# Patient Record
Sex: FEMALE | Race: WHITE | Employment: OTHER
[De-identification: names, ages, dates, MRNs, and addresses within clinical notes are randomized per-mention and may not be internally consistent; named-entity substitution may affect disease eponyms.]

---

## 2018-03-09 ENCOUNTER — SURGERY (OUTPATIENT)
Age: 83
End: 2018-03-09

## 2018-03-09 ENCOUNTER — ANESTHESIA EVENT (OUTPATIENT)
Dept: GASTROENTEROLOGY | Facility: CLINIC | Age: 83
End: 2018-03-09
Payer: MEDICARE

## 2018-03-09 ENCOUNTER — HOSPITAL ENCOUNTER (OUTPATIENT)
Facility: CLINIC | Age: 83
Discharge: HOME | End: 2018-03-09
Attending: INTERNAL MEDICINE | Admitting: INTERNAL MEDICINE
Payer: MEDICARE

## 2018-03-09 ENCOUNTER — ANESTHESIA (OUTPATIENT)
Dept: GASTROENTEROLOGY | Facility: CLINIC | Age: 83
End: 2018-03-09
Payer: MEDICARE

## 2018-03-09 VITALS
SYSTOLIC BLOOD PRESSURE: 139 MMHG | WEIGHT: 185 LBS | OXYGEN SATURATION: 96 % | RESPIRATION RATE: 11 BRPM | DIASTOLIC BLOOD PRESSURE: 73 MMHG | BODY MASS INDEX: 30.82 KG/M2 | HEIGHT: 65 IN

## 2018-03-09 LAB — COLONOSCOPY: NORMAL

## 2018-03-09 PROCEDURE — 40000010 ZZH STATISTIC ANES STAT CODE-CRNA PER MINUTE: Performed by: INTERNAL MEDICINE

## 2018-03-09 PROCEDURE — 37000008 ZZH ANESTHESIA TECHNICAL FEE, 1ST 30 MIN: Performed by: INTERNAL MEDICINE

## 2018-03-09 PROCEDURE — 45378 DIAGNOSTIC COLONOSCOPY: CPT | Performed by: INTERNAL MEDICINE

## 2018-03-09 PROCEDURE — G0105 COLORECTAL SCRN; HI RISK IND: HCPCS | Performed by: INTERNAL MEDICINE

## 2018-03-09 PROCEDURE — 25000128 H RX IP 250 OP 636: Performed by: NURSE ANESTHETIST, CERTIFIED REGISTERED

## 2018-03-09 PROCEDURE — 25000125 ZZHC RX 250: Performed by: NURSE ANESTHETIST, CERTIFIED REGISTERED

## 2018-03-09 RX ORDER — ALBUTEROL SULFATE 90 UG/1
1-2 AEROSOL, METERED RESPIRATORY (INHALATION)
COMMUNITY
Start: 2017-11-17

## 2018-03-09 RX ORDER — LANOLIN ALCOHOL/MO/W.PET/CERES
400 CREAM (GRAM) TOPICAL
COMMUNITY

## 2018-03-09 RX ORDER — LIDOCAINE 40 MG/G
CREAM TOPICAL
Status: DISCONTINUED | OUTPATIENT
Start: 2018-03-09 | End: 2018-03-09 | Stop reason: HOSPADM

## 2018-03-09 RX ORDER — CALCIUM CARBONATE 500 MG/1
500 TABLET, CHEWABLE ORAL
COMMUNITY

## 2018-03-09 RX ORDER — DOXEPIN 6 MG/1
TABLET, FILM COATED ORAL
COMMUNITY
Start: 2018-03-06

## 2018-03-09 RX ORDER — PROPOFOL 10 MG/ML
INJECTION, EMULSION INTRAVENOUS CONTINUOUS PRN
Status: DISCONTINUED | OUTPATIENT
Start: 2018-03-09 | End: 2018-03-09

## 2018-03-09 RX ORDER — LIDOCAINE HYDROCHLORIDE 20 MG/ML
INJECTION, SOLUTION INFILTRATION; PERINEURAL PRN
Status: DISCONTINUED | OUTPATIENT
Start: 2018-03-09 | End: 2018-03-09

## 2018-03-09 RX ORDER — SODIUM CHLORIDE, SODIUM LACTATE, POTASSIUM CHLORIDE, CALCIUM CHLORIDE 600; 310; 30; 20 MG/100ML; MG/100ML; MG/100ML; MG/100ML
INJECTION, SOLUTION INTRAVENOUS CONTINUOUS PRN
Status: DISCONTINUED | OUTPATIENT
Start: 2018-03-09 | End: 2018-03-09

## 2018-03-09 RX ORDER — ONDANSETRON 2 MG/ML
4 INJECTION INTRAMUSCULAR; INTRAVENOUS
Status: DISCONTINUED | OUTPATIENT
Start: 2018-03-09 | End: 2018-03-09 | Stop reason: HOSPADM

## 2018-03-09 RX ORDER — PROPOFOL 10 MG/ML
INJECTION, EMULSION INTRAVENOUS PRN
Status: DISCONTINUED | OUTPATIENT
Start: 2018-03-09 | End: 2018-03-09

## 2018-03-09 RX ORDER — LEVOTHYROXINE SODIUM 50 UG/1
50 TABLET ORAL
COMMUNITY
Start: 2017-06-29

## 2018-03-09 RX ORDER — ACETAMINOPHEN 325 MG/1
325 TABLET ORAL
COMMUNITY

## 2018-03-09 RX ORDER — LISINOPRIL 10 MG/1
10 TABLET ORAL
COMMUNITY
Start: 2017-05-02

## 2018-03-09 RX ORDER — ONDANSETRON 2 MG/ML
INJECTION INTRAMUSCULAR; INTRAVENOUS PRN
Status: DISCONTINUED | OUTPATIENT
Start: 2018-03-09 | End: 2018-03-09

## 2018-03-09 RX ADMIN — SODIUM CHLORIDE, POTASSIUM CHLORIDE, SODIUM LACTATE AND CALCIUM CHLORIDE: 600; 310; 30; 20 INJECTION, SOLUTION INTRAVENOUS at 10:40

## 2018-03-09 RX ADMIN — PROPOFOL 125 MCG/KG/MIN: 10 INJECTION, EMULSION INTRAVENOUS at 10:40

## 2018-03-09 RX ADMIN — LIDOCAINE HYDROCHLORIDE 40 MG: 20 INJECTION, SOLUTION INFILTRATION; PERINEURAL at 10:40

## 2018-03-09 RX ADMIN — PROPOFOL 20 MG: 10 INJECTION, EMULSION INTRAVENOUS at 10:40

## 2018-03-09 RX ADMIN — ONDANSETRON 4 MG: 2 INJECTION INTRAMUSCULAR; INTRAVENOUS at 10:46

## 2018-03-09 ASSESSMENT — ENCOUNTER SYMPTOMS
DYSRHYTHMIAS: 1
SEIZURES: 0

## 2018-03-09 NOTE — ANESTHESIA PREPROCEDURE EVALUATION
Anesthesia Evaluation     . Pt has had prior anesthetic.     No history of anesthetic complications          ROS/MED HX    ENT/Pulmonary:     (+)sleep apnea, asthma doesn't use CPAP , . .    Neurologic: Comment: TBI     (-) seizures and CVA   Cardiovascular:     (+) Dyslipidemia, hypertension--CAD, --stent,. : . CHF . fainting (syncope). pacemaker :- Patient is dependent on pacemaker . dysrhythmias a-fib and 3rd Deg Heart Block, valvular problems/murmurs type: MR . Previous cardiac testing Echodate:2016results:Limited Echocardiogram performed   1. Normal left ventricular size, moderately increased wall thickness, low normal global systolic function, calculated EF of 54 %.   2. Posterior wall is abnormal.   3. Mildly enlarged left atrium.   4. Mild-moderate tricuspid regurgitationdate: results: date: results: date: results:          METS/Exercise Tolerance:     Hematologic:         Musculoskeletal:         GI/Hepatic:     (+) GERD Asymptomatic on medication,      (-) liver disease   Renal/Genitourinary:      (-) renal disease   Endo:     (+) thyroid problem hypothyroidism, .   (-) Type II DM   Psychiatric:         Infectious Disease:         Malignancy:         Other:                     Physical Exam  Normal systems: cardiovascular, pulmonary and dental    Airway   Mallampati: II  TM distance: >3 FB  Neck ROM: full    Dental     Cardiovascular       Pulmonary                     Anesthesia Plan      History & Physical Review  History and physical reviewed and following examination; no interval change.    ASA Status:  3 .    NPO Status:  > 8 hours    Plan for MAC Reason for MAC:  Deep or markedly invasive procedure (G8)  PONV prophylaxis:  Ondansetron (or other 5HT-3)       Postoperative Care      Consents  Anesthetic plan, risks, benefits and alternatives discussed with:  Patient and Spouse..        Procedure: Procedure(s):  COLONOSCOPY  Preop diagnosis: DUE TO AGE     Allergies   Allergen Reactions     Clavulanic  "Acid      Codeine Hives     Contrast Dye Hives     Appears to have tolerated with Benadryl IV   This occurred in 1984 after a renal artery angiogram.  Katelynn Evans RN ....................  8/20/2015   9:55 AM     Cortisone      Levofloxacin Diarrhea     Methylprednisolone Unknown     Patient unsure if reaction, developed 3 individual hives.  but has tolerated prednisone in past     Morphine      Oxycodone Hives and GI Disturbance     Pradaxa      Rosuvastatin Other (See Comments)     myalgia     Sulfa Drugs      Adhesive Tape Rash     Prednisone Palpitations     \"pt goes into afib\"     No past medical history on file.  Past Surgical History:   Procedure Laterality Date     LASER YAG CAPSULOTOMY Left 10/7/2016    Procedure: LASER YAG CAPSULOTOMY;  Surgeon: Robert Cook MD;  Location: [facility]     Prior to Admission medications    Medication Sig Start Date End Date Taking? Authorizing Provider   METOPROLOL TARTRATE PO     Reported, Patient   METOPROLOL SUCCINATE ER PO Take 100 mg by mouth 2 times daily    Reported, Patient   Pantoprazole Sodium (PROTONIX PO) Take by mouth every morning (before breakfast)    Reported, Patient   ATORVASTATIN CALCIUM PO Take 40 mg by mouth    Reported, Patient   ASPIRIN ADULT LOW STRENGTH PO Take 81 mg by mouth daily    Reported, Patient     No current Epic-ordered facility-administered medications on file.      No current [facility]-ordered outpatient prescriptions on file.     Wt Readings from Last 1 Encounters:   No data found for Wt     Temp Readings from Last 1 Encounters:   10/07/16 35.9  C (96.7  F) (Temporal)     BP Readings from Last 6 Encounters:   10/07/16 145/85     Pulse Readings from Last 4 Encounters:   No data found for Pulse     Resp Readings from Last 1 Encounters:   No data found for Resp     SpO2 Readings from Last 1 Encounters:   No data found for SpO2                     .  "

## 2018-03-09 NOTE — ANESTHESIA CARE TRANSFER NOTE
Patient: Leticia Marmolejo    Procedure(s):  COLONOSCOPY (PT HAS PACEMAKER)  - Wound Class: II-Clean Contaminated    Diagnosis: DUE TO AGE   Diagnosis Additional Information: No value filed.    Anesthesia Type:   MAC     Note:  Airway :Nasal Cannula  Patient transferred to:PACU (endo)  Comments: At end of procedure, spontaneous respirations, patient alert to voice, able to follow commands. Oxygen via nasal cannula at 4 liters per minute to PACU. Oxygen tubing connected to wall O2 in PACU, SpO2, NiBP, and EKG monitors and alarms on and functioning, report on patient's clinical status given to PACU RN, RN questions answered.Handoff Report: Identifed the Patient, Identified the Reponsible Provider, Reviewed the pertinent medical history, Discussed the surgical course, Reviewed Intra-OP anesthesia mangement and issues during anesthesia, Set expectations for post-procedure period and Allowed opportunity for questions and acknowledgement of understanding      Vitals: (Last set prior to Anesthesia Care Transfer)    CRNA VITALS  3/9/2018 1029 - 3/9/2018 1102      3/9/2018             Pulse: 73    Ht Rate: 74    SpO2: 97 %    Resp Rate (set): 10                Electronically Signed By: BLANCHE Dunaway CRNA  March 9, 2018  11:02 AM

## 2018-03-09 NOTE — CONSULTS
"Pre-Endoscopy History and Physical     Leticia Marmolejo MRN# [id number]   YOB: 1935 Age: 82 year old     Date of Procedure: 3/9/2018  Primary care provider: Dionna Michelle  Type of Endoscopy: colonoscopy  Reason for Procedure: personal history of colon polyps  Type of Anesthesia Anticipated: MAC    HPI:    Leticia is a 82 year old female who will be undergoing the above procedure.      A history and physical has been performed. The patient's medications and allergies have been reviewed. The risks and benefits of the procedure and the sedation options and risks were discussed with the patient.  All questions were answered and informed consent was obtained.      Allergies   Allergen Reactions     Clavulanic Acid      Codeine Hives     Contrast Dye Hives     Appears to have tolerated with Benadryl IV   This occurred in 1984 after a renal artery angiogram.  Katelynn Evans RN ....................  8/20/2015   9:55 AM     Cortisone      Levofloxacin Diarrhea     Methylprednisolone Unknown     Patient unsure if reaction, developed 3 individual hives.  but has tolerated prednisone in past     Morphine      Oxycodone Hives and GI Disturbance     Pradaxa      Rosuvastatin Other (See Comments)     myalgia     Sulfa Drugs      Adhesive Tape Rash     Prednisone Palpitations     \"pt goes into afib\"        No current facility-administered medications for this encounter.        There is no problem list on file for this patient.       No past medical history on file.     Past Surgical History:   Procedure Laterality Date     LASER YAG CAPSULOTOMY Left 10/7/2016    Procedure: LASER YAG CAPSULOTOMY;  Surgeon: Robert Cook MD;  Location: [facility]       Social History   Substance Use Topics     Smoking status: Not on file     Smokeless tobacco: Not on file     Alcohol use Not on file       No family history on file.      PHYSICAL EXAM:   There were no vitals taken for this visit. There is no height or weight on file " to calculate BMI.   RESP: lungs clear to auscultation - no rales, rhonchi or wheezes  CV: regular rates and rhythm    IMPRESSION   ASA Class 2 - Mild systemic disease      Signed Electronically by: Tressa Astorga MD  March 9, 2018    .